# Patient Record
Sex: MALE | Race: WHITE | Employment: FULL TIME | URBAN - METROPOLITAN AREA
[De-identification: names, ages, dates, MRNs, and addresses within clinical notes are randomized per-mention and may not be internally consistent; named-entity substitution may affect disease eponyms.]

---

## 2020-07-15 ENCOUNTER — OFFICE VISIT (OUTPATIENT)
Dept: SURGERY | Age: 30
End: 2020-07-15
Payer: COMMERCIAL

## 2020-07-15 VITALS
OXYGEN SATURATION: 100 % | SYSTOLIC BLOOD PRESSURE: 135 MMHG | DIASTOLIC BLOOD PRESSURE: 95 MMHG | HEART RATE: 73 BPM | TEMPERATURE: 97.7 F | WEIGHT: 315 LBS | BODY MASS INDEX: 47.74 KG/M2 | HEIGHT: 68 IN

## 2020-07-15 PROCEDURE — 99204 OFFICE O/P NEW MOD 45 MIN: CPT | Performed by: SURGERY

## 2020-07-15 RX ORDER — PHENAZOPYRIDINE HYDROCHLORIDE 200 MG/1
200 TABLET, FILM COATED ORAL 3 TIMES DAILY PRN
COMMUNITY

## 2020-07-15 NOTE — Clinical Note
Rob Toledo,    Super nice scooby who definitely needs weight loss prior to panniculectomy. Jose Armando Cabrera was in office today and I made him come in to talk to both Noa Brooksmary and his mom. They loved his hair and will be working to lose weight. Let me know if you want to talk about some temporary options for him given his pain/symptoms. Hope you're doing well!   Ilia Hurt

## 2020-07-15 NOTE — PROGRESS NOTES
[address] [place] [address] RECONSTRUCTIVE SURGERY    Consultation requested by: Abel Forrest MD    CC: Buried penis syndrome    HPI: 26 y.o. male with a PMHx as delineated below who presents in consultation for buried penis syndrome. 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 He was treated with Nystatin for fungal infections without any improvement. He additionally had an oral agent without improvement. He was seen by Urology who recommended evaluation for panniculectomy at the time of his penis repair, thus plastic surgery was consulted. He notes that he is having problems with pain with urination. Bariatric surgery: No    Max weight (lbs): 390  Lowest weight (lbs):  Unsure (has not checked often)  Current (lbs): 390  Weight change in the past 3 months: Yes (gaining weight)  Max BMI: 59.3  Current BMI: 59.3    History of DVT/PE: No  Excess skin cover genitals: Yes (causing difficulties with urination)    Previous body contouring procedures: No   Smoking: No      PMHx: Depression, anxiety, DJD, scoliosis  PSHx: Disectomy (2/09), left knee arthroscopy      Social History     Socioeconomic History    Marital status: Not on file     Spouse name: Not on file    Number of children: Not on file    Years of education: Not on file    Highest education level: Not on file   Occupational History    Not on file   Social Needs    Financial resource strain: Not on file    Food insecurity     Worry: Not on file     Inability: Not on file    Transportation needs     Medical: Not on file     Non-medical: Not on file   Tobacco Use    Smoking status: Not on file   Substance and Sexual Activity    Alcohol use: Not on file    Drug use: Not on file    Sexual activity: Not on file   Lifestyle    Physical activity     Days per week: Not on file     Minutes per session: Not on file    Stress: Not on file   Relationships    Social connections     Talks on phone: Not on file     Gets together: Not on file     Attends Jain service: Not on file     Active member of club or organization: Not on file     Attends meetings of clubs or organizations: Not on file     Relationship status: Not on file    Intimate partner violence     Fear of current or ex partner: Not on file     Emotionally abused: Not on file     Physically abused: Not on file     Forced sexual activity: Not on file   Other Topics Concern    Not on file   Social History Narrative    Not on file     No family history on file. Allergy: No Known Allergies    ROS History obtained from the patient  General ROS: negative  Psychological ROS: negative  Ophthalmic ROS: negative  Neurological ROS: negative  ENT ROS: negative  Allergy and Immunology ROS: negative  Hematological and Lymphatic ROS: negative  Endocrine ROS: negative  Respiratory ROS: negative  Cardiovascular ROS: negative  Gastrointestinal ROS: See HPI  Genito-Urinary ROS: See HPI  Musculoskeletal ROS: negative   Skin ROS: See HPI. EXAM    BP (!) 135/95 (Site: Left Upper Arm, Position: Sitting, Cuff Size: Large Adult)   Pulse 73   Temp 97.7 °F (36.5 °C) (Temporal)   Ht 5' 8\" (1.727 m)   Wt (!) 390 lb (176.9 kg)   SpO2 100%   BMI 59.30 kg/m²     GEN: NAD, pleasant, obese  CVS: RRR  PULM: No respiratory distress  HEENT: PERRLA/EOMI; dentition (wearing mask), hearing appears within normal limits  NECK: Supple with trachea in midline, no masses  ABD: soft/NT/obese   No palpable hernia, but exam limited secondary to habitus  : Buried penis with fused superior aspect  EXT: No lymphedema noted  NEURO: No focal deficits, no obvious CN deficits    IMP: 26 y.o. male with buried penis syndrome  PLAN: Would benefit from panniculectomy at the same time as repair of his buried penis with Dr. Mariah Perdomo. However, given his current habitus, would not offer this procedure.  We had a lengthy discussion today regarding the importance of weight loss and he is going to undergo bariatric evaluation. If his symptoms continue to worsen, will work with Dr. Berta Mike regarding potential excision of the mons soft tissue above the penis for temporary lengthening, knowing that this would not offer permanent improvement. Once he is able to reach weight stability, will follow-up for definitive procedure (panniculectomy). The complexity of body contouring procedures and wound healing physiology were discussed with the patient. He was counseled on ideal medical optimization (nutrition, weight stability, etc.). We also discussed the pros & cons of staging for multiple procedures including controlling tension from various sites and postoperative care managment. Clinical photos were obtained. The risks, benefits, alternatives, outcomes, personnel involved were discussed and all questions were answered in a satisfactory manner according to the patient. Specifically,the risks including, but not limited to: bleeding possibly requiring transfusion orreoperation, infection, seroma, nonhealing of wounds, poor cosmetic outcome, scarring, VTE (DVT/PE), and death were discussed.       Kailash Enciso MD  [address] [place] [address] [address] Reconstructive Surgery  [phone]  07/15/20

## 2020-07-24 ENCOUNTER — TELEPHONE (OUTPATIENT)
Dept: BARIATRICS/WEIGHT MGMT | Age: 30
End: 2020-07-24

## 2020-07-24 NOTE — TELEPHONE ENCOUNTER
Seminar Date: 7/15/20    Presenter: [place]es    Insurance Type: [employer]    BWLS Covered: Y    Medically Supervised Diet Req: Y    Length of time: 6mo    Has patient had prev bariatric or gastric surgeries : N    Is patient's BMI lower than 35 : N  If yes, BMI :    Does patient have dx of diabetes :    *If previous bariatric or gastric surgeries, please do not schedule until speaking with the provider*     appt 9/2/20

## 2020-08-28 PROBLEM — E66.01 MORBID OBESITY WITH BMI OF 50.0-59.9, ADULT (HCC): Status: ACTIVE | Noted: 2020-08-28

## 2020-08-28 PROBLEM — Z01.818 PREOPERATIVE CLEARANCE: Status: ACTIVE | Noted: 2020-08-28

## 2020-09-02 ENCOUNTER — OFFICE VISIT (OUTPATIENT)
Dept: BARIATRICS/WEIGHT MGMT | Age: 30
End: 2020-09-02
Payer: COMMERCIAL

## 2020-09-02 VITALS
SYSTOLIC BLOOD PRESSURE: 102 MMHG | BODY MASS INDEX: 47.74 KG/M2 | HEART RATE: 80 BPM | WEIGHT: 315 LBS | TEMPERATURE: 97.1 F | HEIGHT: 68 IN | DIASTOLIC BLOOD PRESSURE: 67 MMHG

## 2020-09-02 PROCEDURE — 99204 OFFICE O/P NEW MOD 45 MIN: CPT | Performed by: SURGERY

## 2020-09-02 NOTE — PROGRESS NOTES
Sharon Mojica is a 26 y.o. male with a date of birth of 1990. Vitals:    09/02/20 1442   BP: 102/67   Pulse: 80   Temp: 97.1 °F (36.2 °C)    BMI: Body mass index is 58.42 kg/m². Obesity Classification: Class III    Weight History: Wt Readings from Last 3 Encounters:   09/02/20 (!) 384 lb 3.2 oz (174.3 kg)   07/15/20 (!) 390 lb (176.9 kg)       Patient's lowest adult weight was 220 lbs at age 22. Patient's highest adult weight was 415 lbs at age 30. Patient has participated in the following weight loss programs: none. Patient has not participated in meal replacement/liquid diets. Patient has participated in weight loss medications - Saxenda 2019, stopped d/t insurance. Patient is not lactose intolerant. Patient does not have Christianity/cultural food concerns. Patient does not have food allergies. Patient does not tolerate artificial sweeteners. Pt reports regular sleep routine. Pt eats 1-2x/day, works in [employer] as a [employer]. 24 hour recall/food frequency chart:  Breakfast: no.   Snack: no.   Lunch: yes. BLT with cheese OR chix tenders with sauce   Snack: no.   Dinner: yes. Sometimes - Terriyaki beef with noodles   Snack: no.   Drinks throughout the day: water, soft drinks, sweet tea, Gatorade  Do you drink alcohol? Yes. How often/how much alcohol do you drink: 2-3 Beers, 1x per month. Patient does not meet the criteria for binge eating disorder. Patient does not have grazing. Patient does not have night eating. Patient does have a history eating out of boredom. Pt is interested in sleeve gastrectomy. Surgery  Patient does feel confident in his ability to make these changes. The patient's expectations of post-surgical eating habits are reasonable. Patient states he does understand the consequences of not complying with post-op food guidelines.   Patient states he does understands the long term changes in food intake that will be necessary for all occasions after surgery for the rest of her life. Patient is deemed nutritionally appropriate to proceed. Goals  Weight: 200-250#  Health Improvement: increased quality of life, improved back pain, foot pain    Assessment  Nutritional Needs: RMR=(9.99 x 174.6) + (6.25 x 172.7) - (4.92 x 30 y.o.) -161  = 2505 kcal x 1.4 (sedentary activity factor)= 3507 kcal - 1000 (for 2 lb weight loss/week)= 2507 kcal.    Plan  Plan/Recommendations: Start presurgical guidelines. Goals:   -Eat 4-5 times daily  -Avoid high fat and high sugar foods  -Include protein with all meals and snacks  -Avoid carbonation and caffeine  -Avoid calorie containing beverages  -Increase physical activity as tolerated    PES Statement:  Overweight/Obesity related to lack of exercise, sedentary lifestyle, unhealthy eating habits, and unsuccessful diet attempts as evidenced by BMI. Body mass index is 58.42 kg/m². Will follow up as necessary.     [address]

## 2020-09-12 NOTE — PROGRESS NOTES
[facility]) Physicians   General & Laparoscopic Surgery  [employer]      HPI:    Yaya Wesley is a very pleasant 26 y.o. obese male ,   Body mass index is 58.42 kg/m². And multiple medical problems who is presenting for weight loss surgery evaluation and consultation by Dr. Darius Gallegos primary care provider on file. .    Patient has been struggling for several years now with obesity. Patient feels the weight is an obstacle to achieve and perform things in daily living as well risk on health. Tries to diet, and exercise but can't keep the weight off. Patient tried [place] and other regimens, but with no sustainable weight loss. Patient  is very determined to lose weight and be healthy, and is interested in surgical weight loss to achieve this goal.    Otherwise patient denies any nausea, vomiting, fevers, chills, shortness of breath, chest pain, constipation or urinary symptoms. Pain Assessment   Denies any abdominal pain     Past Medical History:   Diagnosis Date    Morbid obesity with BMI of 50.0-59.9, adult ([facility] [address] 8/28/2020    Preoperative clearance 8/28/2020     History reviewed. No pertinent surgical history. History reviewed. No pertinent family history. Social History     Tobacco Use    Smoking status: Not on file   Substance Use Topics    Alcohol use: Not on file     I counseled the patient on the importance of not smoking and risks of ETOH. No Known Allergies  Vitals:    09/02/20 1442   BP: 102/67   Pulse: 80   Temp: 97.1 °F (36.2 °C)   Weight: (!) 384 lb 3.2 oz (174.3 kg)   Height: 5' 8\" (1.727 m)       Body mass index is 58.42 kg/m².       Current Outpatient Medications:     sertraline (ZOLOFT) 50 MG tablet, Take 50 mg by mouth daily, Disp: , Rfl:     phenazopyridine (PYRIDIUM) 200 MG tablet, Take 200 mg by mouth 3 times daily as needed for Pain, Disp: , Rfl:       Review of Systems - History obtained from the patient  General ROS: negative  Psychological ROS: negative  Ophthalmic ROS: negative  Neurological ROS: negative  ENT ROS: negative  Allergy and Immunology ROS: negative  Hematological and Lymphatic ROS: negative  Endocrine ROS: negative  Respiratory ROS: negative  Cardiovascular ROS: negative  Gastrointestinal ROS:negative  Genito-Urinary ROS: negative  Musculoskeletal ROS: negative   Skin ROS: negative    Physical Exam   Constitutional: Patient is oriented to person, place, and time. Vital signs are normal. Patient  appears well-developed and well-nourished. Patient  is active and cooperative. Non-toxic appearance. No distress. HENT:   Head: Normocephalic and atraumatic. Head is without laceration. Right Ear: External ear normal. No lacerations. No drainage, swelling or tenderness. Left Ear: External ear normal. No lacerations. No drainage, swelling or tenderness. Nose: Nose normal. No nose lacerations or nasal deformity. Mouth/Throat: Uvula is midline, oropharynx is clear and moist and mucous membranes are normal. No oropharyngeal exudate. Eyes: Conjunctivae, EOM and lids are normal. Pupils are equal, round, and reactive to light. Right eye exhibits no discharge. No foreign body present in the right eye. Left eye exhibits no discharge. No foreign body present in the left eye. No scleral icterus. Neck: Trachea normal and normal range of motion. Neck supple. No JVD present. No tracheal tenderness present. Carotid bruit is not present. No rigidity. No tracheal deviation and no edema present. No thyromegaly present. Cardiovascular: Normal rate, regular rhythm, normal heart sounds, intact distal pulses and normal pulses. Pulmonary/Chest: Effort normal and breath sounds normal. No stridor. No respiratory distress. Patient  has no wheezes. Patient has no rales. Patient exhibits no tenderness and no crepitus. Abdominal: Soft. Normal appearance and bowel sounds are normal. Patient exhibits no distension, no abdominal bruit, no ascites and no mass. There is no hepatosplenomegaly. There is no tenderness. There is no rigidity, no rebound, no guarding and no CVA tenderness. No hernia. Hernia confirmed negative in the ventral area. Musculoskeletal: Normal range of motion. Patient exhibits no edema or tenderness. Lymphadenopathy:        Head (right side): No submental, no submandibular, no preauricular, no posterior auricular and no occipital adenopathy present. Head (left side): No submental, no submandibular, no preauricular, no posterior auricular and no occipital adenopathy present. Patient  has no cervical adenopathy. Right: No supraclavicular adenopathy present. Left: No supraclavicular adenopathy present. Neurological: Patient is alert and oriented to person, place, and time. Patient has normal strength. Coordination and gait normal. GCS eye subscore is 4. GCS verbal subscore is 5. GCS motor subscore is 6. Skin: Skin is warm and dry. No abrasion and no rash noted. Patient  is not diaphoretic. No cyanosis or erythema. Psychiatric: Patient has a normal mood and affect. speech is normal and behavior is normal. Cognition and memory are normal.             A/P  Itz Hitchcock is a very pleasant 26 y.o. male with Obesity,  Body mass index is 58.42 kg/m². and multiple obesity related co-morbidities. Itz Hitchcock is very motivated to lose weight and being more healthy. We discussed how his weight affects his overall health including:  Roxanna Riggins was seen today for bariatric, initial visit. Diagnoses and all orders for this visit:    Morbid obesity with BMI of 50.0-59.9, adult ([facility] [address]  -     CBC Auto Differential; Future  -     Comprehensive Metabolic Panel; Future  -     Hemoglobin A1C; Future  -     Iron and TIBC; Future  -     Lipid Panel; Future  -     TSH with Reflex; Future  -     Vitamin B12 & Folate; Future  -     Vitamin D 25 Hydroxy;  Future  -     Alissa White MD, Cardiology, [facility] -

## 2020-09-21 ENCOUNTER — OFFICE VISIT (OUTPATIENT)
Dept: CARDIOLOGY CLINIC | Age: 30
End: 2020-09-21
Payer: COMMERCIAL

## 2020-09-21 ENCOUNTER — TELEPHONE (OUTPATIENT)
Dept: BARIATRICS/WEIGHT MGMT | Age: 30
End: 2020-09-21

## 2020-09-21 VITALS
TEMPERATURE: 97.9 F | SYSTOLIC BLOOD PRESSURE: 128 MMHG | DIASTOLIC BLOOD PRESSURE: 80 MMHG | BODY MASS INDEX: 47.74 KG/M2 | HEIGHT: 68 IN | WEIGHT: 315 LBS | HEART RATE: 74 BPM

## 2020-09-21 PROBLEM — Z01.810 PREOP CARDIOVASCULAR EXAM: Status: ACTIVE | Noted: 2020-09-21

## 2020-09-21 PROCEDURE — 93000 ELECTROCARDIOGRAM COMPLETE: CPT | Performed by: INTERNAL MEDICINE

## 2020-09-21 PROCEDURE — 99203 OFFICE O/P NEW LOW 30 MIN: CPT | Performed by: INTERNAL MEDICINE

## 2020-09-21 SDOH — HEALTH STABILITY: MENTAL HEALTH: HOW MANY STANDARD DRINKS CONTAINING ALCOHOL DO YOU HAVE ON A TYPICAL DAY?: 1 OR 2

## 2020-09-21 SDOH — HEALTH STABILITY: MENTAL HEALTH: HOW OFTEN DO YOU HAVE A DRINK CONTAINING ALCOHOL?: MONTHLY OR LESS

## 2020-09-21 ASSESSMENT — ENCOUNTER SYMPTOMS
WHEEZING: 0
BACK PAIN: 0
ABDOMINAL DISTENTION: 0
SHORTNESS OF BREATH: 0
COLOR CHANGE: 0
BLOOD IN STOOL: 0
ABDOMINAL PAIN: 0
CHEST TIGHTNESS: 0
EYE DISCHARGE: 0
FACIAL SWELLING: 0
COUGH: 0
VOMITING: 0

## 2020-09-21 NOTE — TELEPHONE ENCOUNTER
Pt's mother emailed this RD requesting SG information be emailed to Betina Abreu (her son) as he is working with Dr. Margarita Mancilla presurgically and son requested to be sent information, information emailed to pt at [email or web address]

## 2020-09-21 NOTE — LETTER
[facility] Cardiology  [address] [address]  Phone: [phone]  Fax: [phone]    Jake Thomas MD        9/21/2020      Milvia Jacob YOB: 1990 has an acceptable risk for a non cardiac surgery. No further cardiac work up needed at this time. If there are any questions, please feel free to contact my office at [phone].       Sincerely,            Jake Thomas MD

## 2020-09-27 PROBLEM — Z01.818 PREOPERATIVE CLEARANCE: Status: RESOLVED | Noted: 2020-08-28 | Resolved: 2020-09-27

## 2020-10-02 ENCOUNTER — OFFICE VISIT (OUTPATIENT)
Dept: SLEEP MEDICINE | Age: 30
End: 2020-10-02
Payer: COMMERCIAL

## 2020-10-02 VITALS
BODY MASS INDEX: 47.74 KG/M2 | RESPIRATION RATE: 16 BRPM | DIASTOLIC BLOOD PRESSURE: 106 MMHG | TEMPERATURE: 97.6 F | SYSTOLIC BLOOD PRESSURE: 158 MMHG | HEIGHT: 68 IN | HEART RATE: 79 BPM | OXYGEN SATURATION: 95 % | WEIGHT: 315 LBS

## 2020-10-02 PROCEDURE — 99204 OFFICE O/P NEW MOD 45 MIN: CPT | Performed by: PSYCHIATRY & NEUROLOGY

## 2020-10-02 ASSESSMENT — ENCOUNTER SYMPTOMS
EYES NEGATIVE: 1
GASTROINTESTINAL NEGATIVE: 1
ALLERGIC/IMMUNOLOGIC NEGATIVE: 1
APNEA: 1

## 2020-10-02 ASSESSMENT — SLEEP AND FATIGUE QUESTIONNAIRES
HOW LIKELY ARE YOU TO NOD OFF OR FALL ASLEEP WHILE SITTING AND READING: 1
HOW LIKELY ARE YOU TO NOD OFF OR FALL ASLEEP WHEN YOU ARE A PASSENGER IN A CAR FOR AN HOUR WITHOUT A BREAK: 0
ESS TOTAL SCORE: 2
NECK CIRCUMFERENCE (INCHES): 21
HOW LIKELY ARE YOU TO NOD OFF OR FALL ASLEEP IN A CAR, WHILE STOPPED FOR A FEW MINUTES IN TRAFFIC: 0
HOW LIKELY ARE YOU TO NOD OFF OR FALL ASLEEP WHILE WATCHING TV: 1
HOW LIKELY ARE YOU TO NOD OFF OR FALL ASLEEP WHILE LYING DOWN TO REST IN THE AFTERNOON WHEN CIRCUMSTANCES PERMIT: 0
HOW LIKELY ARE YOU TO NOD OFF OR FALL ASLEEP WHILE SITTING QUIETLY AFTER LUNCH WITHOUT ALCOHOL: 0
HOW LIKELY ARE YOU TO NOD OFF OR FALL ASLEEP WHILE SITTING AND TALKING TO SOMEONE: 0
HOW LIKELY ARE YOU TO NOD OFF OR FALL ASLEEP WHILE SITTING INACTIVE IN A PUBLIC PLACE: 0

## 2020-10-02 NOTE — PROGRESS NOTES
treatment has included- none. The Patient has been obese for many years and tried, has gained 100 in the last 5 years,  unsuccessfully to lose weight through diet, exercise. DOT/CDL - N/A  KUNAL/'magali - N/A  . Previous Report(s) Reviewed: historical medical records       Social History     Socioeconomic History    Marital status:      Spouse name: Not on file    Number of children: Not on file    Years of education: Not on file    Highest education level: Not on file   Occupational History    Not on file   Social Needs    Financial resource strain: Not on file    Food insecurity     Worry: Not on file     Inability: Not on file    Transportation needs     Medical: Not on file     Non-medical: Not on file   Tobacco Use    Smoking status: Never Smoker    Smokeless tobacco: Never Used   Substance and Sexual Activity    Alcohol use: Yes     Frequency: Monthly or less     Drinks per session: 1 or 2     Binge frequency: Never    Drug use: Not on file    Sexual activity: Not on file   Lifestyle    Physical activity     Days per week: Not on file     Minutes per session: Not on file    Stress: Not on file   Relationships    Social connections     Talks on phone: Not on file     Gets together: Not on file     Attends Christian service: Not on file     Active member of club or organization: Not on file     Attends meetings of clubs or organizations: Not on file     Relationship status: Not on file    Intimate partner violence     Fear of current or ex partner: Not on file     Emotionally abused: Not on file     Physically abused: Not on file     Forced sexual activity: Not on file   Other Topics Concern    Not on file   Social History Narrative    Not on file       Prior to Admission medications    Medication Sig Start Date End Date Taking?  Authorizing Provider   sertraline (ZOLOFT) 50 MG tablet Take 50 mg by mouth daily   Yes Historical Provider, MD   phenazopyridine (PYRIDIUM) 200 MG tablet Take 200 mg by mouth 3 times daily as needed for Pain   Yes Historical Provider, MD       Allergies as of 10/02/2020    (No Known Allergies)       Patient Active Problem List   Diagnosis    Morbid obesity with BMI of 50.0-59.9, adult ([facility] [address]    Preop cardiovascular exam       Past Medical History:   Diagnosis Date    Morbid obesity with BMI of 50.0-59.9, adult ([facility] [address] 8/28/2020    Preoperative clearance 8/28/2020       Past Surgical History:   Procedure Laterality Date    BACK SURGERY      l4 and L5    KNEE CARTILAGE SURGERY      TESTICLE SURGERY      undescended testes       Family History   Problem Relation Age of Onset    Hypertension Father        Review of Systems   Constitutional: Positive for fatigue. HENT: Positive for congestion. Eyes: Negative. Respiratory: Positive for apnea. Cardiovascular: Negative. Negative for leg swelling. Gastrointestinal: Negative. Endocrine: Negative. Genitourinary: Positive for frequency. Musculoskeletal: Negative. Skin: Negative. Allergic/Immunologic: Negative. Neurological: Positive for headaches (AM). Hematological: Negative. Psychiatric/Behavioral: Positive for dysphoric mood. The patient is nervous/anxious. Objective:     Vitals:  Weight BMI Neck circumference    Wt Readings from Last 3 Encounters:   10/02/20 (!) 386 lb (175.1 kg)   09/21/20 (!) 382 lb 12.8 oz (173.6 kg)   09/02/20 (!) 384 lb 3.2 oz (174.3 kg)    Body mass index is 58.69 kg/m².  Neck circumference: 21     BP HR SaO2   BP Readings from Last 3 Encounters:   10/02/20 (!) 158/106   09/21/20 128/80   09/02/20 102/67    Pulse Readings from Last 3 Encounters:   10/02/20 79   09/21/20 74   09/02/20 80    SpO2 Readings from Last 3 Encounters:   10/02/20 95%   07/15/20 100%        The mandibular molar Class :   [x]1 []2 []3      Mallampati I Airway Classification:   []1 [x]2 []3 []4        Physical Exam    Assessment:    Obstructive sleep apnea especially with Christopher Weber MD  Medical Director - [facility]

## 2020-10-02 NOTE — PATIENT INSTRUCTIONS
Patient Education        Sleep Apnea: Care Instructions  Your Care Instructions     Sleep apnea means that you frequently stop breathing for 10 seconds or longer during sleep. It can be mild to severe, based on the number of times an hour that you stop breathing or have slowed breathing. Blocked or narrowed airways in your nose, mouth, or throat can cause sleep apnea. Your airway can become blocked when your throat muscles and tongue relax during sleep. You can treat sleep apnea at home by making lifestyle changes. You also can use a CPAP breathing machine that keeps tissues in the throat from blocking your airway. Or your doctor may suggest that you use a breathing device while you sleep. It helps keep your airway open. This could be a device that you put in your mouth. In some cases, surgery may be needed to remove enlarged tissues in the throat. Follow-up care is a key part of your treatment and safety. Be sure to make and go to all appointments, and call your doctor if you are having problems. It's also a good idea to know your test results and keep a list of the medicines you take. How can you care for yourself at home? · Lose weight, if needed. It may reduce the number of times you stop breathing or have slowed breathing. · Sleep on your side. It may stop mild apnea. If you tend to roll onto your back, sew a pocket in the back of your pajama top. Put a tennis ball into the pocket, and stitch the pocket shut. This will help keep you from sleeping on your back. · Avoid alcohol and medicines such as sleeping pills and sedatives before bed. · Do not smoke. Smoking can make sleep apnea worse. If you need help quitting, talk to your doctor about stop-smoking programs and medicines. These can increase your chances of quitting for good. · Prop up the head of your bed 4 to 6 inches by putting bricks under the legs of the bed.   · Treat breathing problems, such as a stuffy nose, caused by a cold or

## 2020-10-05 ENCOUNTER — OFFICE VISIT (OUTPATIENT)
Dept: BARIATRICS/WEIGHT MGMT | Age: 30
End: 2020-10-05
Payer: COMMERCIAL

## 2020-10-05 VITALS
BODY MASS INDEX: 47.74 KG/M2 | DIASTOLIC BLOOD PRESSURE: 83 MMHG | TEMPERATURE: 97.7 F | WEIGHT: 315 LBS | HEIGHT: 68 IN | SYSTOLIC BLOOD PRESSURE: 99 MMHG | HEART RATE: 78 BPM

## 2020-10-05 PROCEDURE — 99213 OFFICE O/P EST LOW 20 MIN: CPT | Performed by: SURGERY

## 2020-10-13 ENCOUNTER — TELEPHONE (OUTPATIENT)
Dept: BARIATRICS/WEIGHT MGMT | Age: 30
End: 2020-10-13

## 2020-10-19 DIAGNOSIS — E66.01 MORBID OBESITY WITH BMI OF 50.0-59.9, ADULT (HCC): ICD-10-CM

## 2020-10-19 LAB
BASOPHILS ABSOLUTE: 0.1 K/UL (ref 0–0.2)
BASOPHILS RELATIVE PERCENT: 0.7 %
EOSINOPHILS ABSOLUTE: 0.2 K/UL (ref 0–0.6)
EOSINOPHILS RELATIVE PERCENT: 1.7 %
HCT VFR BLD CALC: 42.5 % (ref 40.5–52.5)
HEMOGLOBIN: 13.7 G/DL (ref 13.5–17.5)
LYMPHOCYTES ABSOLUTE: 2 K/UL (ref 1–5.1)
LYMPHOCYTES RELATIVE PERCENT: 22.5 %
MCH RBC QN AUTO: 29.4 PG (ref 26–34)
MCHC RBC AUTO-ENTMCNC: 32.3 G/DL (ref 31–36)
MCV RBC AUTO: 91 FL (ref 80–100)
MONOCYTES ABSOLUTE: 0.7 K/UL (ref 0–1.3)
MONOCYTES RELATIVE PERCENT: 7.7 %
NEUTROPHILS ABSOLUTE: 5.9 K/UL (ref 1.7–7.7)
NEUTROPHILS RELATIVE PERCENT: 67.4 %
PDW BLD-RTO: 13.7 % (ref 12.4–15.4)
PLATELET # BLD: 300 K/UL (ref 135–450)
PMV BLD AUTO: 9.5 FL (ref 5–10.5)
RBC # BLD: 4.67 M/UL (ref 4.2–5.9)
WBC # BLD: 8.8 K/UL (ref 4–11)

## 2020-10-20 LAB
A/G RATIO: 1.9 (ref 1.1–2.2)
ALBUMIN SERPL-MCNC: 4.4 G/DL (ref 3.4–5)
ALP BLD-CCNC: 94 U/L (ref 40–129)
ALT SERPL-CCNC: 24 U/L (ref 10–40)
ANION GAP SERPL CALCULATED.3IONS-SCNC: 13 MMOL/L (ref 3–16)
AST SERPL-CCNC: 20 U/L (ref 15–37)
BILIRUB SERPL-MCNC: 0.3 MG/DL (ref 0–1)
BUN BLDV-MCNC: 10 MG/DL (ref 7–20)
CALCIUM SERPL-MCNC: 10.2 MG/DL (ref 8.3–10.6)
CHLORIDE BLD-SCNC: 106 MMOL/L (ref 99–110)
CHOLESTEROL, TOTAL: 208 MG/DL (ref 0–199)
CO2: 25 MMOL/L (ref 21–32)
CREAT SERPL-MCNC: 0.6 MG/DL (ref 0.9–1.3)
ESTIMATED AVERAGE GLUCOSE: 82.5 MG/DL
FOLATE: 6.63 NG/ML (ref 4.78–24.2)
GFR AFRICAN AMERICAN: >60
GFR NON-AFRICAN AMERICAN: >60
GLOBULIN: 2.3 G/DL
GLUCOSE BLD-MCNC: 88 MG/DL (ref 70–99)
HBA1C MFR BLD: 4.5 %
HDLC SERPL-MCNC: 66 MG/DL (ref 40–60)
IRON SATURATION: 24 % (ref 20–50)
IRON: 67 UG/DL (ref 59–158)
LDL CHOLESTEROL CALCULATED: 128 MG/DL
POTASSIUM SERPL-SCNC: 4.7 MMOL/L (ref 3.5–5.1)
SODIUM BLD-SCNC: 144 MMOL/L (ref 136–145)
TOTAL IRON BINDING CAPACITY: 284 UG/DL (ref 260–445)
TOTAL PROTEIN: 6.7 G/DL (ref 6.4–8.2)
TRIGL SERPL-MCNC: 70 MG/DL (ref 0–150)
TSH REFLEX: 0.46 UIU/ML (ref 0.27–4.2)
VITAMIN B-12: 692 PG/ML (ref 211–911)
VITAMIN D 25-HYDROXY: 25.1 NG/ML
VLDLC SERPL CALC-MCNC: 14 MG/DL

## 2020-10-21 PROBLEM — Z01.810 PREOP CARDIOVASCULAR EXAM: Status: RESOLVED | Noted: 2020-09-21 | Resolved: 2020-10-21

## 2020-10-25 ENCOUNTER — HOSPITAL ENCOUNTER (OUTPATIENT)
Dept: SLEEP CENTER | Age: 30
Discharge: HOME | End: 2020-10-25
Payer: COMMERCIAL

## 2020-10-25 PROCEDURE — 95811 POLYSOM 6/>YRS CPAP 4/> PARM: CPT | Performed by: PSYCHIATRY & NEUROLOGY

## 2020-10-25 PROCEDURE — 95811 POLYSOM 6/>YRS CPAP 4/> PARM: CPT

## 2020-10-28 ENCOUNTER — TELEPHONE (OUTPATIENT)
Dept: SLEEP MEDICINE | Age: 30
End: 2020-10-28

## 2020-11-02 ENCOUNTER — OFFICE VISIT (OUTPATIENT)
Dept: BARIATRICS/WEIGHT MGMT | Age: 30
End: 2020-11-02
Payer: COMMERCIAL

## 2020-11-02 VITALS
HEIGHT: 68 IN | DIASTOLIC BLOOD PRESSURE: 89 MMHG | WEIGHT: 315 LBS | SYSTOLIC BLOOD PRESSURE: 130 MMHG | BODY MASS INDEX: 47.74 KG/M2 | HEART RATE: 85 BPM

## 2020-11-02 PROCEDURE — 99213 OFFICE O/P EST LOW 20 MIN: CPT | Performed by: SURGERY

## 2020-11-02 NOTE — TELEPHONE ENCOUNTER
Received letter from [facility] [employer] [facility]: EGD was denied due to Limitations and exclusions. The plan does not provide benefits for: Obesity services other than the covered services listed on the medical schedule of benefits. There is no coverage for bariatric surgery under this benefit. Pt notified. Pt will call his HR to discuss further. Pt to call back to let us know if he will proceed further or not.

## 2020-11-02 NOTE — PROGRESS NOTES
[facility]) Physicians   General & Laparoscopic Surgery  [employer]       HPI:     Elkin Carrero is a very pleasant 26 y.o. male with Body mass index is 57.96 kg/m². , Pre-Surgery. Pre-operative clearance and work up pending. Working hard to keep good dietary habits as well level of activity. Patient denies any nausea, vomiting, fevers, chills, shortness of breath, chest pain, cough, constipation or difficulty urinating. Past Medical History:   Diagnosis Date    Morbid obesity with BMI of 50.0-59.9, adult ([facility] [address] 8/28/2020    Preoperative clearance 8/28/2020     Past Surgical History:   Procedure Laterality Date    BACK SURGERY      l4 and L5    KNEE CARTILAGE SURGERY      TESTICLE SURGERY      undescended testes     Family History   Problem Relation Age of Onset    Hypertension Father      Social History     Tobacco Use    Smoking status: Never Smoker    Smokeless tobacco: Never Used   Substance Use Topics    Alcohol use: Yes     Frequency: Monthly or less     Drinks per session: 1 or 2     Binge frequency: Never     I counseled the patient on the importance of not smoking and risks of ETOH. No Known Allergies  Vitals:    11/02/20 0954   BP: 130/89   Pulse: 85   Weight: (!) 381 lb 3.2 oz (172.9 kg)   Height: 5' 8\" (1.727 m)       Body mass index is 57.96 kg/m².       Current Outpatient Medications:     sertraline (ZOLOFT) 50 MG tablet, Take 50 mg by mouth daily, Disp: , Rfl:     phenazopyridine (PYRIDIUM) 200 MG tablet, Take 200 mg by mouth 3 times daily as needed for Pain, Disp: , Rfl:       Review of Systems - History obtained from the patient  General ROS: negative  Psychological ROS: negative  Endocrine ROS: negative  Respiratory ROS: negative  Cardiovascular ROS: negative  Gastrointestinal ROS:negative  Genito-Urinary ROS: negative  Musculoskeletal ROS: negative   Skin ROS: negative    Physical Exam   Vitals Reviewed   Constitutional: Patient is oriented to person, place, and time. Patient appears well-developed and well-nourished. Patient is active and cooperative. Non-toxic appearance. No distress. Neck: Trachea normal and normal range of motion. No JVD present. Pulmonary/Chest: Effort normal. No accessory muscle usage or stridor. No apnea. No respiratory distress. Cardiovascular: Normal rate and no JVD. Abdominal: Normal appearance. Patient exhibits no distension. Abdomen is soft, obese, non tender. Musculoskeletal: Normal range of motion. Patient exhibits no edema. Neurological: Patient is alert and oriented to person, place, and time. Patient has normal strength. GCS eye subscore is 4. GCS verbal subscore is 5. GCS motor subscore is 6. Skin: Skin is warm and dry. No abrasion and no rash noted. Patient is not diaphoretic. No cyanosis or erythema. Psychiatric: Patient has a normal mood and affect. Speech is normal and behavior is normal. Cognition and memory are normal.       A/P    Sarah Samples is 26 y.o. male, Body mass index is 57.96 kg/m². pre surgery, has lost 5.8# since last visit. The patient underwent dietary counseling with registered dietician. I have reviewed, discussed and agree with the dietary plan. Patient is trying hard to keep good dietary and behavior modifications. Patient is monitoring portion sizes, food choices and liquid calories. Patient is trying to exercise regularly as much as possible. We discussed how his weight affects his overall health including:  Vita Mandujano was seen today for weight management. Diagnoses and all orders for this visit:    Morbid obesity with BMI of 50.0-59.9, adult (HCC)    MAKENNA (obstructive sleep apnea)       and importance of weight loss to alleviate those co morbid conditions. I encouraged the patient to continue exercise and keeping healthy eating habits. Discussed pre-op labs and work up till now. Also counseled the patient extensively on Surgery.      I spent 15 minutes face to face with patient with more than 50% of the time counseling and/or coordinating care for weight loss surgery. RTC in 4 weeks  Obtain rest of pre-op work up / clearances  Diet and Exercise      Patient advised that its their responsibility to follow up for studies and/or labs ordered today.      Alireza Santana

## 2020-11-02 NOTE — PROGRESS NOTES
Alberto Stagers lost 5.8 lbs over 1 month. Pt reports eliminating soft drinks and decreasing sweet tea this month, being mindful of food choices. Is pt eating at least 4 times everyday? No, eating 2 meals consisently, difficult     Is pt eating a lean protein source with all meals and snacks? yes, lunch meat & cheese, protein shake    Has pt decreased their portions using the plate method? yes, mindful of portion size    Is pt choosing low fat/sugar free options? Yes, trying    Is pt drinking at least 64 oz of clear liquids everyday? Yes, water with SF flavoring, Propel, Gatorade Zero, Sweet tea    Has pt stopped drinking carbonation, caffeinated, and sugar sweetened beverages? no, sett tea    Has pt sampled Unjury and/or Nectar protein?  No, reviewed and encouraged    Participating in intentional exercise? none     Plan/Recommendations:   -  Increase meal/snack frequency to 4x daily to include protein with each meal/snakc  - Eliminate sweet tea  - Try protein powder      Handouts: Protein Bar, Protein Shake    Dixon Tomlinson

## 2020-11-13 ENCOUNTER — TELEPHONE (OUTPATIENT)
Dept: BARIATRICS/WEIGHT MGMT | Age: 30
End: 2020-11-13

## 2020-11-13 NOTE — TELEPHONE ENCOUNTER
Patient is scheduled for EGD on 11/16/20 with Dr. Kary Luong. Due to having current insurance issues patient has to cancel this procedure. He is currently working on [employer] problem and will let us know further at a later date what he can do.